# Patient Record
Sex: MALE | URBAN - METROPOLITAN AREA
[De-identification: names, ages, dates, MRNs, and addresses within clinical notes are randomized per-mention and may not be internally consistent; named-entity substitution may affect disease eponyms.]

---

## 2023-11-20 ENCOUNTER — APPOINTMENT (OUTPATIENT)
Dept: URBAN - METROPOLITAN AREA CLINIC 256 | Age: 43
Setting detail: DERMATOLOGY
End: 2023-11-20

## 2023-11-20 VITALS — HEIGHT: 75 IN | WEIGHT: 230 LBS

## 2023-11-20 DIAGNOSIS — L738 OTHER SPECIFIED DISEASES OF HAIR AND HAIR FOLLICLES: ICD-10-CM

## 2023-11-20 DIAGNOSIS — L20.89 OTHER ATOPIC DERMATITIS: ICD-10-CM

## 2023-11-20 DIAGNOSIS — L663 OTHER SPECIFIED DISEASES OF HAIR AND HAIR FOLLICLES: ICD-10-CM

## 2023-11-20 PROBLEM — L02.821 FURUNCLE OF HEAD [ANY PART, EXCEPT FACE]: Status: ACTIVE | Noted: 2023-11-20

## 2023-11-20 PROBLEM — L02.222 FURUNCLE OF BACK [ANY PART, EXCEPT BUTTOCK]: Status: ACTIVE | Noted: 2023-11-20

## 2023-11-20 PROCEDURE — OTHER OTC TREATMENT REGIMEN: OTHER

## 2023-11-20 PROCEDURE — OTHER MIPS QUALITY: OTHER

## 2023-11-20 PROCEDURE — OTHER PRESCRIPTION: OTHER

## 2023-11-20 PROCEDURE — OTHER PATIENT SPECIFIC COUNSELING: OTHER

## 2023-11-20 PROCEDURE — 99204 OFFICE O/P NEW MOD 45 MIN: CPT

## 2023-11-20 PROCEDURE — OTHER COUNSELING: OTHER

## 2023-11-20 PROCEDURE — OTHER PRESCRIPTION MEDICATION MANAGEMENT: OTHER

## 2023-11-20 RX ORDER — CLINDAMYCIN PHOSPHATE 10 MG/ML
SOLUTION TOPICAL
Qty: 60 | Refills: 3 | Status: ERX | COMMUNITY
Start: 2023-11-20

## 2023-11-20 ASSESSMENT — SEVERITY ASSESSMENT 2020: SEVERITY 2020: ALMOST CLEAR

## 2023-11-20 ASSESSMENT — LOCATION SIMPLE DESCRIPTION DERM
LOCATION SIMPLE: LEFT SHOULDER
LOCATION SIMPLE: POSTERIOR SCALP
LOCATION SIMPLE: LOWER BACK
LOCATION SIMPLE: RIGHT SHOULDER

## 2023-11-20 ASSESSMENT — ITCH NUMERIC RATING SCALE: HOW SEVERE IS YOUR ITCHING?: 0

## 2023-11-20 ASSESSMENT — LOCATION DETAILED DESCRIPTION DERM
LOCATION DETAILED: MID-OCCIPITAL SCALP
LOCATION DETAILED: LEFT OCCIPITAL SCALP
LOCATION DETAILED: LEFT POSTERIOR SHOULDER
LOCATION DETAILED: INFERIOR LUMBAR SPINE
LOCATION DETAILED: RIGHT INFERIOR OCCIPITAL SCALP
LOCATION DETAILED: RIGHT POSTERIOR SHOULDER
LOCATION DETAILED: RIGHT OCCIPITAL SCALP

## 2023-11-20 ASSESSMENT — LOCATION ZONE DERM
LOCATION ZONE: TRUNK
LOCATION ZONE: ARM
LOCATION ZONE: SCALP

## 2023-11-20 ASSESSMENT — BSA RASH: BSA RASH: 5

## 2023-11-20 ASSESSMENT — SEVERITY ASSESSMENT: SEVERITY: ALMOST CLEAR

## 2023-11-20 ASSESSMENT — PAIN INTENSITY VAS: HOW INTENSE IS YOUR PAIN 0 BEING NO PAIN, 10 BEING THE MOST SEVERE PAIN POSSIBLE?: NO PAIN

## 2023-11-20 NOTE — PROCEDURE: PRESCRIPTION MEDICATION MANAGEMENT
Render In Strict Bullet Format?: No
Detail Level: Zone
Plan: If symptoms persist follow up in 6 weeks, otherwise, annually for script RF.
Initiate Treatment: Apply clindamycin 1% solution to affected areas once to twice daily as needed for folliculitis.

## 2023-11-20 NOTE — PROCEDURE: OTC TREATMENT REGIMEN
Patient Specific Otc Recommendations (Will Not Stick From Patient To Patient): Apply to affected areas 1-2 times daily for eczema.
Detail Level: Zone
Samples Given: Eucerin and Cetaphil

## 2023-11-20 NOTE — HPI: SKIN LESION
What Type Of Note Output Would You Prefer (Optional)?: Standard Output
Has Your Skin Lesion Been Treated?: not been treated
Is This A New Presentation, Or A Follow-Up?: Skin Lesions
Additional History: The patient noted the skin lesions on the back of the head, shoulder, and back feeling like ingrown hairs and just Ed wanted them evaluated
all other ROS negative except as per HPI

## 2023-11-20 NOTE — PROCEDURE: PATIENT SPECIFIC COUNSELING
Informed the patient his skin lesions located on the head and lower back and some of the shoulders appear to be consistent with Folliculitis. Discussed treatment options of W/M vs topic abx solution to use when symptomatic vs benzoyl peroxide.  The patient elected to treat with medication.\\n\\nHe reports that he has been using clindamycin in his beard for the last several years with improvement.
Detail Level: Zone
Informed the patient these patches of dry skin appears to be consistent with an early eczematous rash. He reports that he gets dry annually in this area and that his moisturizers at home don't seem to quite hydrate his skin enough.\\n\\nRecommended trialing eczema relief creams and samples were given. The patient was agreeable.